# Patient Record
Sex: MALE | Race: WHITE | NOT HISPANIC OR LATINO | URBAN - METROPOLITAN AREA
[De-identification: names, ages, dates, MRNs, and addresses within clinical notes are randomized per-mention and may not be internally consistent; named-entity substitution may affect disease eponyms.]

---

## 2017-06-30 ENCOUNTER — OFFICE VISIT (OUTPATIENT)
Dept: FAMILY MEDICINE | Facility: CLINIC | Age: 52
End: 2017-06-30

## 2017-06-30 VITALS
WEIGHT: 181.6 LBS | DIASTOLIC BLOOD PRESSURE: 82 MMHG | HEART RATE: 74 BPM | TEMPERATURE: 98.7 F | HEIGHT: 70 IN | BODY MASS INDEX: 26 KG/M2 | RESPIRATION RATE: 20 BRPM | SYSTOLIC BLOOD PRESSURE: 146 MMHG | OXYGEN SATURATION: 96 %

## 2017-06-30 DIAGNOSIS — B07.9 VIRAL WARTS, UNSPECIFIED TYPE: ICD-10-CM

## 2017-06-30 DIAGNOSIS — J06.9 VIRAL URI WITH COUGH: Primary | ICD-10-CM

## 2017-06-30 PROCEDURE — 99213 OFFICE O/P EST LOW 20 MIN: CPT | Mod: 25 | Performed by: PHYSICIAN ASSISTANT

## 2017-06-30 PROCEDURE — 17110 DESTRUCTION B9 LES UP TO 14: CPT | Performed by: PHYSICIAN ASSISTANT

## 2017-06-30 NOTE — MR AVS SNAPSHOT
"              After Visit Summary   2017    Chetan Byers    MRN: [id number]           Patient Information     Date Of Birth          1965        Visit Information        Provider Department      2017 11:45 AM Manasa Prakash PA-C [facility]        Today's Diagnoses     Viral URI with cough    -  1    Viral warts, unspecified type           Follow-ups after your visit        Follow-up notes from your care team     Return in about 4 weeks (around 2017).      Who to contact     If you have questions or need follow up information about today's clinic visit or your schedule please contact [facility] directly at [phone].  Normal or non-critical lab and imaging results will be communicated to you by MyChart, letter or phone within 4 business days after the clinic has received the results. If you do not hear from us within 7 days, please contact the clinic through V[employer] or phone. If you have a critical or abnormal lab result, we will notify you by phone as soon as possible.  Submit refill requests through D[employer] or call your pharmacy and they will forward the refill request to us. Please allow 3 business days for your refill to be completed.          Additional Information About Your Visit        MyChart Information     Diavibe lets you send messages to your doctor, view your test results, renew your prescriptions, schedule appointments and more. To sign up, go to [email or web address] . Click on \"Log in\" on the left side of the screen, which will take you to the Welcome page. Then click on \"Sign up Now\" on the right side of the page.     You will be asked to enter the access code listed below, as well as some personal information. Please follow the directions to create your username and password.     Your access code is: [email or web address]  Expires: 2017  4:39 PM     Your access code will  in 90 days. If you need help or a new code, " "please call your [facility] clinic or [phone].        Care EveryWhere ID     This is your Care EveryWhere ID. This could be used by other organizations to access your [facility] medical records  [id number]        Your Vitals Were     Pulse Temperature Respirations Height Pulse Oximetry BMI (Body Mass Index)    74 98.7  F (37.1  C) (Oral) 20 1.778 m (5' 10\") 96% 26.06 kg/m2       Blood Pressure from Last 3 Encounters:   06/30/17 146/82   08/29/16 128/82   12/08/08 130/70    Weight from Last 3 Encounters:   06/30/17 82.4 kg (181 lb 9.6 oz)   08/29/16 84.8 kg (187 lb)   12/08/08 78.5 kg (173 lb)              We Performed the Following     DESTRUCT BENIGN LESION, UP TO 14        Primary Care Provider Office Phone # Fax #    Danielle Loyd [phone]       [place] [address] [address]  [address]  [facility] [address]        Equal Access to Services     [facility]: Hadii aad ku hadasho Soomaali, waaxda luqadaha, qaybta kaalmada adeegyakassi, latanya domínguez . So [facility] [phone].    ATENCIÓN: Si habla español, tiene a amin disposición servicios gratuitos de asistencia lingüística. [facility] [phone].    We comply with applicable federal civil rights laws and [place] laws. We do not discriminate on the basis of race, color, national origin, age, disability sex, sexual orientation or gender identity.            Thank you!     Thank you for choosing [facility]  for your care. Our goal is always to provide you with excellent care. Hearing back from our patients is one way we can continue to improve our services. Please take a few minutes to complete the written survey that you may receive in the mail after your visit with us. Thank you!             Your Updated Medication List - Protect others around you: Learn how to safely use, store and throw away your medicines at www.disposemymeds.org.          This list is accurate as of: 6/30/17  " 4:39 PM.  Always use your most recent med list.                   Brand Name Dispense Instructions for use Diagnosis    NO ACTIVE MEDICATIONS      .

## 2017-06-30 NOTE — NURSING NOTE
Chetan Byers is here for a cough for the past week. Feels in his chest, does cough up phlegm. Has had chills and sweats.  Questioned patient about current smoking habits.  Pt. has never smoked.  PULSE regular  My Chart:   CLASSIFICATION OF OVERWEIGHT AND OBESITY BY BMI                        Obesity Class           BMI(kg/m2)  Underweight                                    < 18.5  Normal                                         18.5-24.9  Overweight                                     25.0-29.9  OBESITY                     I                  30.0-34.9                             II                 35.0-39.9  EXTREME OBESITY             III                >40                            Patient's  BMI Body mass index is 26.06 kg/(m^2).  http://hin.nhlbi.nih.gov/menuplanner/menu.cgi  Pre-visit planning  Immunizations - up to date  Colonoscopy - is due and to be scheduled by patient for later completion

## 2017-06-30 NOTE — PROGRESS NOTES
"CC: Cough    History:  Chetan is here today with a cough that has been ongoing for 1-1.5 weeks. Has deep productive cough, but not blood. Cough is mainly during the day, so he has still been sleeping well. No shortness of breath, wheezing, palpitations, chest pain. Was sweating first night, but no fevers, sweats, chills since that time. Has been taking generic cough suppressant.     Chetan also mentions that he has a wart on his right thumb. He had this treated back in October, but it came back and he has been doing home treatments. He would like this treated today.       PMH, MEDICATIONS, ALLERGIES, SOCIAL AND FAMILY HISTORY in [facility] and reviewed by me personally.      ROS negative other than the symptoms noted above in the HPI.        Examination   /82 (BP Location: Right arm, Patient Position: Chair, Cuff Size: Adult Regular)  Pulse 74  Temp 98.7  F (37.1  C) (Oral)  Resp 20  Ht 1.778 m (5' 10\")  Wt 82.4 kg (181 lb 9.6 oz)  SpO2 96%  BMI 26.06 kg/m2       Constitutional: Sitting comfortably, in no acute distress. Vital signs noted  Eyes: pupils equal round reactive to light and accomodation, extra ocular movements intact  Ears: external canals and TMs free of abnormalities  Nose: patent, without mucosal abnormalities  Mouth and throat: without erythema or lesions of the mucosa  Neck:  no adenopathy, trachea midline and normal to palpation  Cardiovascular:  regular rate and rhythm, no murmurs, clicks, or gallops  Respiratory:  normal respiratory rate and rhythm, lungs clear to auscultation  Skin: Hyperkeratotic plaque with thrombosed capillaries.   Location: Right Thumb  Size: 8 mm  Psychiatric: mentation appears normal and affect normal/bright    Procedure:    Area cleansed with alcohol.  Skin pared using 15 blade.  Treatment was accomplished using liquid nitrogen and the three freeze/thaw method x 3 cycles.      PLAN:       A/P    ICD-10-CM    1. Viral URI with cough J06.9     B97.89    2. Viral warts, " unspecified type B07.9 DESTRUCT BENIGN LESION, UP TO 14    right thumb       DISCUSSION: Chetan's cough is likely still viral. Recommended he continue to use Mucinex Max. If he is not feeling better in 1 week he can contact me, and we can consider antibiotic at that time.     Performed wart removal.   Discussed viral cause, and transmission.   Patient was instructed in the changes that would take place after treatment with liquid nitrogen and to call for any questions. If signs of infection should return to clinic.  Jenn MARIE Advised.   Return to clinic in 3-4 weeks for re-treatment if not resolved.    follow up visit: 4 weeks, wart re treatment.    Manasa Prakash PA-C  [facility]

## 2017-07-19 ENCOUNTER — OFFICE VISIT (OUTPATIENT)
Dept: FAMILY MEDICINE | Facility: CLINIC | Age: 52
End: 2017-07-19

## 2017-07-19 VITALS
OXYGEN SATURATION: 98 % | HEIGHT: 70 IN | RESPIRATION RATE: 16 BRPM | BODY MASS INDEX: 25.91 KG/M2 | DIASTOLIC BLOOD PRESSURE: 86 MMHG | HEART RATE: 72 BPM | WEIGHT: 181 LBS | SYSTOLIC BLOOD PRESSURE: 136 MMHG | TEMPERATURE: 98.2 F

## 2017-07-19 DIAGNOSIS — B07.8 COMMON WART: Primary | ICD-10-CM

## 2017-07-19 PROCEDURE — 17110 DESTRUCTION B9 LES UP TO 14: CPT | Performed by: FAMILY MEDICINE

## 2017-07-19 NOTE — MR AVS SNAPSHOT
"              After Visit Summary   7/19/2017    Chetan Byers    MRN: [id number]           Patient Information     Date Of Birth          1965        Visit Information        Provider Department      7/19/2017 2:45 PM Danielle Loyd MD [facility]        Today's Diagnoses     Common wart    -  1      Care Instructions    Liquid nitrogen was applied to 1 wart(s);  the patient will   return at 2-4 week intervals for retreatments as needed          Follow-ups after your visit        Follow-up notes from your care team     Return if symptoms worsen or fail to improve.      Who to contact     If you have questions or need follow up information about today's clinic visit or your schedule please contact [facility] directly at [phone].  Normal or non-critical lab and imaging results will be communicated to you by MyChart, letter or phone within 4 business days after the clinic has received the results. If you do not hear from us within 7 days, please contact the clinic through MyChart or phone. If you have a critical or abnormal lab result, we will notify you by phone as soon as possible.  Submit refill requests through [employer] or call your pharmacy and they will forward the refill request to us. Please allow 3 business days for your refill to be completed.          Additional Information About Your Visit        Care EveryWhere ID     This is your Care EveryWhere ID. This could be used by other organizations to access your [facility] medical records  [id number]        Your Vitals Were     Pulse Temperature Respirations Height Pulse Oximetry BMI (Body Mass Index)    72 98.2  F (36.8  C) (Oral) 16 1.765 m (5' 9.5\") 98% 26.35 kg/m2       Blood Pressure from Last 3 Encounters:   07/19/17 136/86   06/30/17 146/82   08/29/16 128/82    Weight from Last 3 Encounters:   07/19/17 82.1 kg (181 lb)   06/30/17 82.4 kg (181 lb 9.6 oz)   08/29/16 84.8 kg (187 lb)              We " Performed the Following     DESTRUCT BENIGN LESION, UP TO 14        Primary Care Provider Office Phone # Fax #    Danielle Loyd [phone]       [facility] [address]  [address]  [facility] [address]        Equal Access to Services     [facility] : Hadii aad ku hadasho Soomaali, waaxda luqadaha, qaybta kaalmada adeegyada, waxay maryin hayaan aderosa m castillochitotorrie domínguez . So [facility] [phone].    ATENCIÓN: Si habla español, tiene a amin disposición servicios gratuitos de asistencia lingüística. Llame al [phone].    We comply with applicable federal civil rights laws and [place] laws. We do not discriminate on the basis of race, color, national origin, age, disability sex, sexual orientation or gender identity.            Thank you!     Thank you for choosing [facility]  for your care. Our goal is always to provide you with excellent care. Hearing back from our patients is one way we can continue to improve our services. Please take a few minutes to complete the written survey that you may receive in the mail after your visit with us. Thank you!             Your Updated Medication List - Protect others around you: Learn how to safely use, store and throw away your medicines at www.disposemymeds.org.          This list is accurate as of: 7/19/17  3:02 PM.  Always use your most recent med list.                   Brand Name Dispense Instructions for use Diagnosis    NO ACTIVE MEDICATIONS      .

## 2017-07-19 NOTE — PATIENT INSTRUCTIONS
Liquid nitrogen was applied to 1 wart(s);  the patient will   return at 2-4 week intervals for retreatments as needed

## 2020-12-14 NOTE — PROGRESS NOTES
Route to PCP for refill   SUBJECTIVE: 52 year old male needs retreatment of wart(s).    OBJECTIVE: 1 wart(s) noted on the fingers. Size range is 1 cm.    ASSESSMENT: Warts (Verruca Vulgaris)    PLAN: Liquid nitrogen was applied to 1 wart(s);  the patient will   return at 2-4 week intervals for retreatments as needed.

## 2022-08-15 ENCOUNTER — OFFICE VISIT (OUTPATIENT)
Dept: FAMILY MEDICINE | Facility: CLINIC | Age: 57
End: 2022-08-15

## 2022-08-15 VITALS
BODY MASS INDEX: 27.16 KG/M2 | RESPIRATION RATE: 22 BRPM | WEIGHT: 194 LBS | HEART RATE: 83 BPM | DIASTOLIC BLOOD PRESSURE: 98 MMHG | SYSTOLIC BLOOD PRESSURE: 158 MMHG | TEMPERATURE: 98 F | HEIGHT: 71 IN | OXYGEN SATURATION: 96 %

## 2022-08-15 DIAGNOSIS — Z00.00 ROUTINE HISTORY AND PHYSICAL EXAMINATION OF ADULT: Primary | ICD-10-CM

## 2022-08-15 DIAGNOSIS — Z13.220 LIPID SCREENING: ICD-10-CM

## 2022-08-15 DIAGNOSIS — Z11.59 ENCOUNTER FOR HCV SCREENING TEST FOR LOW RISK PATIENT: ICD-10-CM

## 2022-08-15 DIAGNOSIS — Z12.5 PROSTATE CANCER SCREENING: ICD-10-CM

## 2022-08-15 DIAGNOSIS — I10 ESSENTIAL HYPERTENSION: ICD-10-CM

## 2022-08-15 DIAGNOSIS — Z13.1 SCREENING FOR DIABETES MELLITUS: ICD-10-CM

## 2022-08-15 DIAGNOSIS — Z11.4 SCREENING FOR HIV WITHOUT PRESENCE OF RISK FACTORS: ICD-10-CM

## 2022-08-15 LAB
BUN SERPL-MCNC: 13 MG/DL (ref 7–25)
BUN/CREATININE RATIO: 11.1 (ref 6–22)
CALCIUM SERPL-MCNC: 10.1 MG/DL (ref 8.6–10.3)
CHLORIDE SERPLBLD-SCNC: 104.9 MMOL/L (ref 98–110)
CHOLEST SERPL-MCNC: 193 MG/DL (ref 0–199)
CHOLEST/HDLC SERPL: 4 {RATIO} (ref 0–5)
CO2 SERPL-SCNC: 30 MMOL/L (ref 20–32)
CREAT SERPL-MCNC: 1.17 MG/DL (ref 0.6–1.3)
GLUCOSE SERPL-MCNC: 110 MG/DL (ref 60–99)
HBA1C MFR BLD: 5.3 % (ref 4–7)
HDLC SERPL-MCNC: 52 MG/DL (ref 40–150)
LDLC SERPL CALC-MCNC: 124 MG/DL (ref 0–130)
POTASSIUM SERPL-SCNC: 4.97 MMOL/L (ref 3.5–5.3)
SODIUM SERPL-SCNC: 142.6 MMOL/L (ref 135–146)
TRIGL SERPL-MCNC: 84 MG/DL (ref 0–149)

## 2022-08-15 PROCEDURE — 80048 BASIC METABOLIC PNL TOTAL CA: CPT | Performed by: STUDENT IN AN ORGANIZED HEALTH CARE EDUCATION/TRAINING PROGRAM

## 2022-08-15 PROCEDURE — 80061 LIPID PANEL: CPT | Performed by: STUDENT IN AN ORGANIZED HEALTH CARE EDUCATION/TRAINING PROGRAM

## 2022-08-15 PROCEDURE — 99386 PREV VISIT NEW AGE 40-64: CPT | Performed by: STUDENT IN AN ORGANIZED HEALTH CARE EDUCATION/TRAINING PROGRAM

## 2022-08-15 PROCEDURE — 83036 HEMOGLOBIN GLYCOSYLATED A1C: CPT | Performed by: STUDENT IN AN ORGANIZED HEALTH CARE EDUCATION/TRAINING PROGRAM

## 2022-08-15 NOTE — PATIENT INSTRUCTIONS
Labs today    Check BP at home at least 3-4x/wk at various times of day. Goal < 130/80 consistently. If elevated, rest a few minutes and recheck.    Follow-up in 1-2 months to review    Consider 4th covid vaccine and shingles vaccine at your pharmacy

## 2022-08-15 NOTE — NURSING NOTE
Chief Complaint   Patient presents with     New Patient     Has been awhile since last seen at [facility], wants to re-establish here today      Physical     Annual, fasting      Pre-visit Screening:  Immunizations:  up to date  Colonoscopy:  is up to date  Mammogram: NA  Asthma Action Test/Plan:  NA  PHQ9:  PHQ-2 done today   GAD7:  No concerns  Questioned patient about current smoking habits Pt. has never smoked.  Ok to leave detailed message on voice mail for today's visit only Yes, phone # [phone]

## 2022-08-16 ENCOUNTER — MYC MEDICAL ADVICE (OUTPATIENT)
Dept: FAMILY MEDICINE | Facility: CLINIC | Age: 57
End: 2022-08-16

## 2022-08-16 LAB
ABBOTT PSA - QUEST: 1.28 NG/ML
HCV AB - QUEST: NORMAL
HIV 1/2 AGN ABY 4TH GEN WITH REFLEX: NORMAL
SIGNAL TO CUT OFF - QUEST: <0.02

## 2022-10-15 ENCOUNTER — HEALTH MAINTENANCE LETTER (OUTPATIENT)
Age: 57
End: 2022-10-15

## 2023-10-29 ENCOUNTER — HEALTH MAINTENANCE LETTER (OUTPATIENT)
Age: 58
End: 2023-10-29

## 2024-04-11 ENCOUNTER — NURSE TRIAGE (OUTPATIENT)
Dept: NURSING | Facility: CLINIC | Age: 59
End: 2024-04-11
Payer: COMMERCIAL

## 2024-04-11 NOTE — TELEPHONE ENCOUNTER
Patient calling reports having a nosebleed that starts and stops over the past few minutes. Reports he is leaning into the sink with a cold compress on the nose. Denies fainting, dizziness or pale skin. Reviewed proper technique with patient expressing understanding.     Kely Tena RN 04/11/24 3:35 AM   [facility] Triage Nurse Advisor      Reason for Disposition   [1] Nosebleed AND [2] bleeding present < 30 minutes AND [3] using correct technique per guideline    Additional Information   Negative: Fainted or too weak to stand following large blood loss   Negative: Sounds like a life-threatening emergency to the triager   Negative: Nosebleed followed a nose injury   Negative: [1] Bleeding present > 30 minutes AND [2] using correct method of direct pressure   Negative: [1] Bleeding now AND [2] second call after being instructed in correct technique of direct pressure   Negative: Dizziness or lightheadedness   Negative: Pale skin (pallor) of new-onset or worsening   Negative: [1] Has nasal packing (inserted by health care provider to control bleeding) AND [2] new rash   Negative: [1] Has nasal packing AND [2] now bleeding around the packing  (Exception: Few drops or ooze.)   Negative: Patient sounds very sick or weak to the triager   Negative: [1] Bleeding recurs 3 or more times in 24 hours AND [2] direct pressure applied correctly   Negative: Has nasal packing (inserted by health care provider to control bleeding)   Negative: [1] Skin bruises or bleeding gums AND [2] not caused by an injury   Negative: [1] Large amount of blood has been lost (e.g., 1 cup or 240 ml) AND [2] bleeding now controlled (stopped)   Negative: [1] Has nasal packing AND [2] fever > 100.4 F (38.0 C)   Negative: Taking Coumadin (warfarin) or other strong blood thinner, or known bleeding disorder (e.g., thrombocytopenia)   Negative: Hard-to-stop nosebleeds are a chronic symptom (recurrent or ongoing AND present > 4 weeks)   Negative: Easy  bleeding present in other family members   Negative: [1] Mild-moderate nosebleed AND [2] bleeding stopped now    Protocols used: Nosebleed-A-AH

## 2024-04-11 NOTE — TELEPHONE ENCOUNTER
Patient was seen this morning in the ED and had his nose bleed cauterized and his nose is bleeding again after packing and is wondering what to do.  Patient was asked to review AVS for follow up information and reads that if packing becomes soaked he should return to ED.  Patient states that he will go. Marisol Pitt RN on 4/11/2024 at 6:44 PM    Reason for Disposition   [1] Has nasal packing AND [2] now bleeding around the packing  (Exception: Few drops or ooze.)   [1] Follow-up call to recent contact AND [2] information only call, no triage required    Protocols used: Nosebleed-A-, Information Only Call - No Triage-A-

## 2024-12-21 ENCOUNTER — HEALTH MAINTENANCE LETTER (OUTPATIENT)
Age: 59
End: 2024-12-21